# Patient Record
Sex: MALE | Race: OTHER
[De-identification: names, ages, dates, MRNs, and addresses within clinical notes are randomized per-mention and may not be internally consistent; named-entity substitution may affect disease eponyms.]

---

## 2018-05-09 ENCOUNTER — RECORD ABSTRACTING (OUTPATIENT)
Age: 18
End: 2018-05-09

## 2018-05-09 ENCOUNTER — APPOINTMENT (OUTPATIENT)
Age: 18
End: 2018-05-09
Payer: COMMERCIAL

## 2018-05-09 VITALS
HEART RATE: 64 BPM | SYSTOLIC BLOOD PRESSURE: 123 MMHG | TEMPERATURE: 98.4 F | WEIGHT: 168 LBS | BODY MASS INDEX: 23.26 KG/M2 | DIASTOLIC BLOOD PRESSURE: 73 MMHG | HEIGHT: 71.2 IN

## 2018-05-09 DIAGNOSIS — Z87.898 PERSONAL HISTORY OF OTHER SPECIFIED CONDITIONS: ICD-10-CM

## 2018-05-09 DIAGNOSIS — Z87.820 PERSONAL HISTORY OF TRAUMATIC BRAIN INJURY: ICD-10-CM

## 2018-05-09 DIAGNOSIS — Z87.09 PERSONAL HISTORY OF OTHER DISEASES OF THE RESPIRATORY SYSTEM: ICD-10-CM

## 2018-05-09 DIAGNOSIS — S43.431D SUPERIOR GLENOID LABRUM LESION OF RIGHT SHOULDER, SUBSEQUENT ENCOUNTER: ICD-10-CM

## 2018-05-09 DIAGNOSIS — Z87.39 PERSONAL HISTORY OF OTHER DISEASES OF THE MUSCULOSKELETAL SYSTEM AND CONNECTIVE TISSUE: ICD-10-CM

## 2018-05-09 DIAGNOSIS — H10.45 OTHER CHRONIC ALLERGIC CONJUNCTIVITIS: ICD-10-CM

## 2018-05-09 DIAGNOSIS — S49.90XA UNSPECIFIED INJURY OF SHOULDER AND UPPER ARM, UNSPECIFIED ARM, INITIAL ENCOUNTER: ICD-10-CM

## 2018-05-09 DIAGNOSIS — G24.9 DYSTONIA, UNSPECIFIED: ICD-10-CM

## 2018-05-09 DIAGNOSIS — Z86.19 PERSONAL HISTORY OF OTHER INFECTIOUS AND PARASITIC DISEASES: ICD-10-CM

## 2018-05-09 DIAGNOSIS — Z13.6 ENCOUNTER FOR SCREENING FOR CARDIOVASCULAR DISORDERS: ICD-10-CM

## 2018-05-09 DIAGNOSIS — R47.9 UNSPECIFIED SPEECH DISTURBANCES: ICD-10-CM

## 2018-05-09 DIAGNOSIS — Z82.49 FAMILY HISTORY OF ISCHEMIC HEART DISEASE AND OTHER DISEASES OF THE CIRCULATORY SYSTEM: ICD-10-CM

## 2018-05-09 LAB
BILIRUB UR QL STRIP: NORMAL
GLUCOSE UR-MCNC: NORMAL
HCG UR QL: 2 EU/DL
HGB UR QL STRIP.AUTO: NORMAL
KETONES UR-MCNC: ABNORMAL
LEUKOCYTE ESTERASE UR QL STRIP: NORMAL
NITRITE UR QL STRIP: NORMAL
PH UR STRIP: 8.5
PROT UR STRIP-MCNC: NORMAL
SP GR UR STRIP: 1.01

## 2018-05-09 PROCEDURE — 90472 IMMUNIZATION ADMIN EACH ADD: CPT

## 2018-05-09 PROCEDURE — 99395 PREV VISIT EST AGE 18-39: CPT | Mod: 25

## 2018-05-09 PROCEDURE — 81003 URINALYSIS AUTO W/O SCOPE: CPT | Mod: QW

## 2018-05-09 PROCEDURE — 90471 IMMUNIZATION ADMIN: CPT

## 2018-05-09 PROCEDURE — 90632 HEPA VACCINE ADULT IM: CPT

## 2018-05-09 PROCEDURE — 90651 9VHPV VACCINE 2/3 DOSE IM: CPT

## 2018-05-09 PROCEDURE — 92551 PURE TONE HEARING TEST AIR: CPT

## 2018-05-09 RX ORDER — CARBAMAZEPINE 100 MG/1
100 TABLET, EXTENDED RELEASE ORAL DAILY
Qty: 30 | Refills: 0 | Status: ACTIVE | COMMUNITY
Start: 2018-05-09

## 2018-05-09 NOTE — HISTORY OF PRESENT ILLNESS
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

## 2018-05-09 NOTE — DISCUSSION/SUMMARY
[Normal Growth] : growth [Normal Development] : development  [None] : No known medical problems [No Elimination Concerns] : elimination [No Feeding Concerns] : feeding [No Skin Concerns] : skin [Normal Sleep Pattern] : sleep [No Medications] : ~He/She~ is not on any medications [Patient] : patient [Parent/Guardian] : parent/guardian [FreeTextEntry1] : He will get labs fasting and yearly exams needed

## 2018-05-09 NOTE — PHYSICAL EXAM
[General Appearance - Well Developed] : interactive [General Appearance - Well-Appearing] : well appearing [General Appearance - In No Acute Distress] : in no acute distress [Appearance Of Head] : the head was normocephalic [Sclera] : the conjunctiva were normal [Outer Ear] : the ears and nose were normal in appearance [Both Tympanic Membranes Were Examined] : both tympanic membranes were normal [Nasal Cavity] : the nasal mucosa and septum were normal [Examination Of The Oral Cavity] : the teeth, gums, and palate were normal [Oropharynx] : the oropharynx was normal  [Neck Cervical Mass (___cm)] : no neck mass was observed [Respiration, Rhythm And Depth] : normal respiratory rhythm and effort [Auscultation Breath Sounds / Voice Sounds] : clear bilateral breath sounds [Heart Rate And Rhythm] : heart rate and rhythm were normal [Heart Sounds] : normal S1 and S2 [Murmurs] : no murmurs [Bowel Sounds] : normal bowel sounds [Abdomen Soft] : soft [Abdomen Tenderness] : non-tender [Abdominal Distention] : nondistended [Abnormal Walk] : normal gait [Musculoskeletal - Swelling] : no joint swelling seen [Musculoskeletal Exam: Normal Movement Of All Extremities] : normal movements of all extremities [Motor Tone] : muscle strength and tone were normal [No Visual Abnormalities] : no visible abnormailities [No Scoliosis] : no scoliosis [Intact] : all reflexes within normal limits bilaterally [Deep Tendon Reflexes (DTR)] : deep tendon reflexes were 2+ and symmetric [Generalized Lymph Node Enlargement] : no lymphadenopathy [Skin Color & Pigmentation] : normal skin color and pigmentation [] : no significant rash [Skin Lesions] : no skin lesions [Initial Inspection: Infant Active And Alert] : active and alert [Penis Abnormality] : the penis was normal [Scrotum] : the scrotum was normal [Testes Mass (___cm)] : there were no testicular masses [Robinson Stage _____] : the Robinson stage for pubic hair development was [unfilled]

## 2018-05-09 NOTE — REVIEW OF SYSTEMS
[Nl] : Genitourinary [Change in Activity] : no change in activity [Fever] : no fever [Wgt Loss (___ Lbs)] : no recent weight loss [Eye Discharge] : no eye discharge [Redness] : no redness [Swollen Eyelids] : no swollen eyelids [Change in Vision] : no change in vision  [Nasal Stuffiness] : no nasal congestion [Sore Throat] : no sore throat [Earache] : no earache [Nosebleeds] : no epistaxis [Cyanosis] : no cyanosis [Edema] : no edema [Diaphoresis] : not diaphoretic [Exercise Intolerance] : no persistence of exercise intolerance [Chest Pain] : no chest pain or discomfort [Palpitations] : no palpitations [Tachypnea] : not tachypneic [Wheezing] : no wheezing [Cough] : no cough [Shortness of Breath] : no shortness of breath [Change in Appetite] : no change in appetite [Vomiting] : no vomiting [Diarrhea] : no diarrhea [Abdominal Pain] : no abdominal pain [Constipation] : no constipation [Fainting (Syncope)] : no fainting [Seizure] : no seizures [Headache] : no headache [Dizziness] : no dizziness [Limping] : no limping [Joint Pains] : no arthralgias [Joint Swelling] : no joint swelling [Back Pain] : ~T no back pain [Muscle Aches] : no muscle aches [Rash] : no rash [Insect Bites] : no insect bites [Skin Lesions] : no skin lesions [Bruising] : no tendency for easy bruising [Swollen Glands] : no lymphadenopathy [Sleep Disturbances] : ~T no sleep disturbances [Hyperactive] : no hyperactive behavior [Emotional Problems] : no ~T emotional problems [Change In Personality] : ~T no personality change [Dec Urine Output] : no oliguria [Urinary Frequency] : no change in urinary frequency [Pain During Urination (Dysuria)] : no dysuria [Testicular Pain] : no testicular pain [Pubertal Concerns] : no pubertal concerns

## 2018-06-11 ENCOUNTER — APPOINTMENT (OUTPATIENT)
Age: 18
End: 2018-06-11

## 2018-08-03 ENCOUNTER — APPOINTMENT (OUTPATIENT)
Dept: PEDIATRICS | Facility: CLINIC | Age: 18
End: 2018-08-03

## 2019-10-03 ENCOUNTER — APPOINTMENT (OUTPATIENT)
Dept: ORTHOPEDIC SURGERY | Facility: CLINIC | Age: 19
End: 2019-10-03
Payer: MEDICAID

## 2019-10-03 VITALS
HEART RATE: 67 BPM | HEIGHT: 72 IN | WEIGHT: 193 LBS | SYSTOLIC BLOOD PRESSURE: 113 MMHG | BODY MASS INDEX: 26.14 KG/M2 | DIASTOLIC BLOOD PRESSURE: 71 MMHG

## 2019-10-03 DIAGNOSIS — S43.014A ANTERIOR DISLOCATION OF RIGHT HUMERUS, INITIAL ENCOUNTER: ICD-10-CM

## 2019-10-03 PROCEDURE — 73030 X-RAY EXAM OF SHOULDER: CPT | Mod: RT

## 2019-10-03 PROCEDURE — 99204 OFFICE O/P NEW MOD 45 MIN: CPT

## 2019-10-03 NOTE — HISTORY OF PRESENT ILLNESS
[de-identified] : 19 year old male s/p right posterior labral repair 10/7/15, presents today s/p right anterior shoulder dislocation 10.1.19. He dove for a baseball and dislocated his shoulder on 10/1/19. Shoulder was reduced in [facility]. First dislocation since his surgery. Placed in a sling. Denies numbness or tingling. Rates his pain 2/10 and taking Tylenol as needed. \par \par The patient's past medical history, past surgical history, medications and allergies were reviewed by me today with the patient and documented accordingly. In addition, the patient's family and social history, which were noncontributory to this visit, were reviewed also.

## 2019-10-03 NOTE — DISCUSSION/SUMMARY
[de-identified] : 19 year old male s/p right shoulder dislocation.\par  \par The patient presents following his posterior labral repair right shoulder, with complaints of anterior dislocation with pain radiating to the anterior shoulder. Patient was reduced in the ER, x-rays today show concentric reduction of the glenohumeral joint. Discussion was had with the patient regarding likely labral pathology as well as internal derangement. Would recommend MRI imaging at this time. This also reports some contralateral shoulder pain, so we'll obtain an MRI of the left shoulder to confirm that there is no significant internal derangement also.\par \par MRI Rx given to patient to further delineate any internal structural derangement to the shoulder. This will allow for better understanding of the severity of labral injury and allow for better stratification of treatment strategies (surgical vs. non-surgical). Patient is agreeable to further imaging.\par  \par Sling for comfort, ice, NSAIDS. \par \par Followup: After MRI\par

## 2019-10-03 NOTE — PHYSICAL EXAM
[de-identified] : General: well-appearing, not in acute distress and not obese. \par Gait: normal. \par Oriented to time, place, person\par Mood: Normal\par Affect: Normal\par \par Right shoulder exam\par \par Inspection: No malalignment, No defects, No atrophy\par Skin: No masses, No lesions\par Neck: Negative Spurling's, full ROM, no pain with ROM\par AROM: FF to 90, abduction to 70, ER to 45, IR to hip\par Painful arc ROM: none\par Tenderness: no bicipital tenderness, no tenderness to the greater tuberosity/RTC insertion, no anterior shoulder/lesser tuberosity tenderness\par Strength: 4/5 ER, 4/5 IR in adduction, 5/5 supraspinatus testing, negative Grover's test\par AC Joint: No ttp/pain with cross arm testing\par Biceps: Speed Negative, Yergusons Negative\par Impingement test: Negative Walls, Negative Neer \par Stability: Stable\par Vasc: 2+ radial pulse\par Neuro: AIN, PIN, Ulnar nerve intact to motor\par Sensation: Intact to light touch throughout [de-identified] : The following radiographs were ordered and read by me during this patients visit. I reviewed each radiograph in detail with the patient and discussed the findings as highlighted below. \par \par 3 views of the right shoulder were obtained today 10/03/2019  that show no acute fracture or dislocation. There is no glenohumeral and no AC joint degenerative change seen. Type I acromion. There is no significant malalignment. No significant other obvious osseous abnormality, otherwise unremarkable.\par

## 2019-10-03 NOTE — ADDENDUM
[FreeTextEntry1] : This note was written by Brionna Varghese on 10/03/2019 acting solely as a scribe for Dr. Waldemar Ballard.\par \par All medical record entries made by the Scribe were at my, Dr. Waldemar Ballard, direction and personally dictated by me on 10/03/2019. I have personally reviewed the chart and agree that the record accurately reflects my personal performance of the history, physical exam, assessment and plan.\par

## 2019-10-11 ENCOUNTER — APPOINTMENT (OUTPATIENT)
Dept: MRI IMAGING | Facility: CLINIC | Age: 19
End: 2019-10-11
Payer: MEDICAID

## 2019-10-11 ENCOUNTER — OUTPATIENT (OUTPATIENT)
Dept: OUTPATIENT SERVICES | Facility: HOSPITAL | Age: 19
LOS: 1 days | End: 2019-10-11
Payer: MEDICAID

## 2019-10-11 DIAGNOSIS — Z00.8 ENCOUNTER FOR OTHER GENERAL EXAMINATION: ICD-10-CM

## 2019-10-11 DIAGNOSIS — Z98.89 OTHER SPECIFIED POSTPROCEDURAL STATES: Chronic | ICD-10-CM

## 2019-10-11 PROCEDURE — 73221 MRI JOINT UPR EXTREM W/O DYE: CPT

## 2019-10-11 PROCEDURE — 73221 MRI JOINT UPR EXTREM W/O DYE: CPT | Mod: 26,RT

## 2019-10-23 ENCOUNTER — APPOINTMENT (OUTPATIENT)
Dept: ORTHOPEDIC SURGERY | Facility: CLINIC | Age: 19
End: 2019-10-23
Payer: MEDICAID

## 2019-10-23 DIAGNOSIS — S43.431D SUPERIOR GLENOID LABRUM LESION OF RIGHT SHOULDER, SUBSEQUENT ENCOUNTER: ICD-10-CM

## 2019-10-23 PROCEDURE — 99214 OFFICE O/P EST MOD 30 MIN: CPT

## 2019-10-23 NOTE — DISCUSSION/SUMMARY
[de-identified] : 19 year old male s/p right posterior labral repair 10/7/15, presents s/p right shoulder dislocation.\par \par Patient is a . Consideration is for acute repair of anterior labrum which may jeopardize upcoming season. He is aware of the risks and benefits but has elected to rehab the shoulder first.\par \par Recommendations: Conservative care & observation, this includes rest/activity avoidance until less symptomatic with subsequent gradual return to full activity as tolerated. Patient may also use OTC NSAID's or acetaminophen as tolerated, with application of ice to the area 2-3x daily for 20 minutes after periods of activity.\par \par Begin a trial of PT. Rx given. \par \par Follow up as needed.

## 2019-10-23 NOTE — ADDENDUM
[FreeTextEntry1] : This note was written by Brionna Varghese on 10/23/2019 acting solely as a scribe for Dr. Waldemar Ballard.\par \par All medical record entries made by the Scribe were at my, Dr. Waldemar Ballard, direction and personally dictated by me on 10/23/2019. I have personally reviewed the chart and agree that the record accurately reflects my personal performance of the history, physical exam, assessment and plan.\par

## 2019-10-23 NOTE — PHYSICAL EXAM
[de-identified] : General: well-appearing, not in acute distress and not obese. \par Gait: normal. \par Oriented to time, place, person\par Mood: Normal\par Affect: Normal\par \par Right shoulder exam\par \par Inspection: No malalignment, No defects, No atrophy\par Skin: No masses, No lesions\par Neck: Negative Spurling's, full ROM, no pain with ROM\par AROM: FF to 180, abduction to 90, ER to 45, IR to upper lumbar\par Painful arc ROM: none\par Tenderness: no bicipital tenderness, no tenderness to the greater tuberosity/RTC insertion, no anterior shoulder/lesser tuberosity tenderness\par Strength: 5/5 ER, 5/5 IR in adduction, 5/5 supraspinatus testing, negative Rochester's test\par AC Joint: No ttp/pain with cross arm testing\par Biceps: Speed Negative, Yergusons Negative\par Impingement test: Negative Walls, Negative Neer \par Stability: Stable\par Vasc: 2+ radial pulse\par Neuro: AIN, PIN, Ulnar nerve intact to motor\par Sensation: Intact to light touch throughout [de-identified] : MRI right shoulder dated 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 s/p posterior labral repair, equivocal findings. \par \par 3 views of the right shoulder were obtained today 10/03/2019  that show no acute fracture or dislocation. There is no glenohumeral and no AC joint degenerative change seen. Type I acromion. There is no significant malalignment. No significant other obvious osseous abnormality, otherwise unremarkable.\par

## 2019-10-23 NOTE — HISTORY OF PRESENT ILLNESS
[de-identified] : 19 year old male s/p right posterior labral repair 10/7/15, presents today s/p right anterior shoulder dislocation. He is here for MRI review. He dove for a baseball and dislocated his shoulder on 10/1/19. Shoulder was reduced in [facility] ER. First dislocation since his surgery. Denies numbness or tingling. Rates his pain 2/10 and taking Tylenol as needed.

## 2021-01-21 ENCOUNTER — HOSPITAL ENCOUNTER (EMERGENCY)
Dept: HOSPITAL 62 - ER | Age: 21
Discharge: HOME | End: 2021-01-21
Payer: COMMERCIAL

## 2021-01-21 VITALS — SYSTOLIC BLOOD PRESSURE: 148 MMHG | DIASTOLIC BLOOD PRESSURE: 64 MMHG

## 2021-01-21 DIAGNOSIS — R82.998: ICD-10-CM

## 2021-01-21 DIAGNOSIS — R10.2: Primary | ICD-10-CM

## 2021-01-21 LAB
APPEARANCE UR: CLEAR
APTT PPP: YELLOW S
BILIRUB UR QL STRIP: NEGATIVE
CHLAM PCR: NOT DETECTED
GLUCOSE UR STRIP-MCNC: NEGATIVE MG/DL
KETONES UR STRIP-MCNC: 20 MG/DL
NITRITE UR QL STRIP: NEGATIVE
PH UR STRIP: 5 [PH] (ref 5–9)
PROT UR STRIP-MCNC: NEGATIVE MG/DL
SP GR UR STRIP: 1.03
UROBILINOGEN UR-MCNC: NEGATIVE MG/DL (ref ?–2)

## 2021-01-21 PROCEDURE — 87491 CHLMYD TRACH DNA AMP PROBE: CPT

## 2021-01-21 PROCEDURE — 99283 EMERGENCY DEPT VISIT LOW MDM: CPT

## 2021-01-21 PROCEDURE — 87591 N.GONORRHOEAE DNA AMP PROB: CPT

## 2021-01-21 PROCEDURE — 81001 URINALYSIS AUTO W/SCOPE: CPT

## 2021-01-21 NOTE — ER DOCUMENT REPORT
ED Medical Screen (RME)





- General


Chief Complaint: Abdominal Pain


Stated Complaint: PENILE PAIN





- HPI


Notes: 





01/21/21 19:46


Rapid Medical Exam                                





HPI: 20-year-old male presents to the ER complaining of suprapubic pain and dark

urine that began yesterday.  He denies kidney stone history.  No fevers, chills,

testicular pain or swelling.  No penile discharge.  No recent sexual activity.  

No concern for STD.  Patient is relatively asymptomatic at this time.











Physical Exam:





GENERAL: Well-appearing, well-nourished and in no acute distress.


HEAD: Atraumatic, normocephalic.


ENT: Moist mucous membranes.


RESP: Respirations even and unlabored


CV- Regular rate. 


NEURO: No focal neurological deficits. Moves all extremities spontaneously and 

on command.








My involvement in this patients care was limited to a rapid initial assessment. 

A comprehensive ED assessment and evaluation of the patient, analysis of test 

results, treatment, and completion of the medical decision making process will 

be performed by other ER providers.





- Related Data


Allergies/Adverse Reactions: 


                                        





No Known Allergies Allergy (Unverified 01/21/21 19:36)


   








Home Medications: CARBAMAZAPINE





Past Medical History





- Social History


Frequency of alcohol use: Occasional


Drug Abuse: Marijuana





Physical Exam





- Vital signs


Vitals: 





                                        











Temp Pulse Resp BP Pulse Ox


 


 98.4 F   63   16   134/79 H  100 


 


 01/21/21 18:56  01/21/21 18:56  01/21/21 18:56  01/21/21 18:56  01/21/21 18:56














Course





- Vital Signs


Vital signs: 





                                        











Temp Pulse Resp BP Pulse Ox


 


 98.4 F   63   16   134/79 H  100 


 


 01/21/21 18:56  01/21/21 18:56  01/21/21 18:56  01/21/21 18:56  01/21/21 18:56

## 2021-01-21 NOTE — ER DOCUMENT REPORT
ED General





- General


Chief Complaint: Abdominal Pain


Stated Complaint: PENILE PAIN


Primary Care Provider: 


REGAN RAI [Primary Care Provider] - Follow up as needed


Notes: 





20-year-old male with no significant past medical history presents with i

ntermittent mild to moderate pain in his right and left inguinal areas that 

began at rest yesterday.  Patient says his pain was never severe but since it 

was new he went to urgent care to have it checked out and he was very surprised 

when they sent him to the ED because he felt that his symptoms were not 

emergent.  Patient endorses on review of systems that he he thinks his urine may

have appeared slightly darker than usual today.  Patient has not been sexually 

active in the past several months and has had no STD history and no known sick 

contacts.  Patient has been working out more recently and doing many abdominal 

muscle exercises.  Patient denies any dysuria, urgency, frequency, hematuria, 

brown urine, flank pain, vomiting, constipation, obstipation, fever, trauma, 

myalgia, dizziness or weakness, fainting, prior episodes, prior abdominal 

surgeries, immunocompromise history, STD history rashes or lesions, penile 

symptoms/discharge, testicular pain or swelling





- Related Data


Allergies/Adverse Reactions: 


                                        





No Known Allergies Allergy (Unverified 01/21/21 19:36)


   








Home Medications: CARBAMAZAPINE





Past Medical History





- General


Information source: Patient





- Social History


Smoking Status: Never Smoker


Frequency of alcohol use: Occasional


Drug Abuse: Marijuana


Family History: Reviewed & Not Pertinent





Review of Systems





- Review of Systems


Notes: 





REVIEW OF SYSTEMS:


CONSTITUTIONAL :  Denies fever,  chills, or sweats. 


EENT: Denies recent cold/sinus symptoms, denies throat pain


CARDIOVASCULAR:  Denies chest pain, HODA


RESPIRATORY:  Denies cough, denies shortness of breath.


GASTROINTESTINAL:  Denies abdominal pain, nausea/vomiting.


GENITOURINARY:  Denies difficulty urinating, painful urination.


MUSCULOSKELETAL:  Denies neck pain, back pain.


SKIN:   Denies rash or skin lesions.


HEMATOLOGIC :   Denies easy bruising or bleeding.


LYMPHATIC:  Denies swollen, enlarged glands.


NEUROLOGICAL:  Denies headache, denies change in gait.


PSYCHIATRIC:  Denies anxiety or stress or depression.





Physical Exam





- Vital signs


Vitals: 





                                        











Temp Pulse Resp BP Pulse Ox


 


 98.4 F   63   16   134/79 H  100 


 


 01/21/21 18:56  01/21/21 18:56  01/21/21 18:56  01/21/21 18:56  01/21/21 18:56














- Notes


Notes: 





PHYSICAL EXAMINATION:


 


GENERAL: Very well-appearing, well-nourished, young adult male sitting up in 

stretcher without any visible signs of discomfort and in no acute distress.  

Good historian.


 


HEAD: Atraumatic, normocephalic.


 


EYES: Pupils equal round and appropriate constriction, sclera anicteric, 

conjunctiva are normal.


 


ENT: nares patent, moist mucous membranes.


 


NECK: Normal range of motion, supple without lymphadenopathy


 


LUNGS: Breath sounds clear to auscultation bilaterally and equal.  No wheezes 

rales or rhonchi.


 


HEART: Regular rate and rhythm without murmurs


 


ABDOMEN: Soft, nontender, no guarding, no rebound, no masses, no CVAT





: Normal external male genitalia, testicles with normal appearance nontender, 

no penile discharge, no inguinal swelling or lymphadenopathy, no inguinal 

tenderness, normal skin


 


EXTREMITIES: Normal range of motion, no pitting or edema.  No cyanosis.


 


NEUROLOGICAL: Awake, alert, conversing appropriately, moves all extremities 

spontaneously.


 


PSYCH: Normal mood, normal affect.


 


SKIN: Warm, Dry, normal turgor, no rashes or lesions noted.





Course





- Re-evaluation


Re-evalutation: 





01/21/21 22:18


Very mild inguinal symptoms for 2 days without systemic symptoms.  No STD risk 

factors and no testicular or penile symptoms, no abdominal pain or tenderness, 

very well-appearing on exam with normal vitals and completely normal exam.  

Patient has been exercising frequently recently and symptoms may be due to 

tendinitis or strain of the inferior insertions of abdominal muscles.  No signs 

of STDs, rhabdo, UTI, atypical appendicitis, hernia, or renal colic.  Patient 

with normal UA without any blood or RBCs, GC sent but clinical suspicion too low

to treat empirically, patient agrees that symptoms are mild and we will continue

to watch them.  Gave extensive return to ED precautions which demonstrated 

understanding of.  Instructed him to follow-up with primary doctor and 

urologist.  Pain controlled without any analgesia.  Patient ready for discharge.

 Patient denied having any other questions or concerns at time of discharge.





- Vital Signs


Vital signs: 





                                        











Temp Pulse Resp BP Pulse Ox


 


 98.4 F   63   16   134/79 H  100 


 


 01/21/21 18:56  01/21/21 18:56  01/21/21 18:56  01/21/21 18:56  01/21/21 18:56














- Laboratory Results


Laboratory Results Interpreted: 





                                        











  01/21/21





  19:48


 


Urine Ketones  20 H











Critical Laboratory Results Reviewed: No Critical Results





- Radiology Results


Critical Radiology Results Reviewed: No Critical Results





Discharge





- Discharge


Clinical Impression: 


 Inguinal pain of both sides





Disposition: HOME, SELF-CARE


Additional Instructions: 


Follow-up with a primary doctor and urologist within 1 week.  If you have any 

worsening pain, difficulty urinating, pain with urinating, blood in urine, body 

aches, dizziness, weakness, fever, vomiting, inability to pass stool or gas, 

spreading pain, rash, or any other worsening or alarming symptoms return to the 

emergency department immediately.


Referrals: 


ROSALIA MARTINEZ MD [COMMUNITY BASED STAFF] - Follow up in 1 week


LEE WOODWARD MD [NO LOCAL MD] - Follow up in 1 week


QUINN MCGOWAN MD [NO LOCAL MD] - Follow up in 1 week